# Patient Record
Sex: MALE | Race: WHITE | NOT HISPANIC OR LATINO | Employment: OTHER | URBAN - METROPOLITAN AREA
[De-identification: names, ages, dates, MRNs, and addresses within clinical notes are randomized per-mention and may not be internally consistent; named-entity substitution may affect disease eponyms.]

---

## 2017-08-07 PROBLEM — M54.6 THORACIC SPINE PAIN: Status: ACTIVE | Noted: 2017-08-07

## 2017-11-21 ENCOUNTER — PATIENT OUTREACH (OUTPATIENT)
Dept: HEALTH INFORMATION MANAGEMENT | Facility: OTHER | Age: 72
End: 2017-11-21

## 2017-11-21 NOTE — PROGRESS NOTES
Attempt #:1    WebIZ Checked & Epic Updated: Epic matches WebIZ  · WebIZ Recommendations: Patient is up to date on all vaccines  · Is patient due for Tdap? NO  · Is patient due for Shingles? NO  [facility] Verified: no  Verify PCP: yes    Communication Preference Obtained: yes     Review Care Team: yes    Annual Wellness Visit Scheduling  1. Scheduling Status:Scheduled     Care Gap Scheduling (Attempt to Schedule EACH Overdue Care Gap!)     There are no preventive care reminders to display for this patient.     Scheduled patient for Annual Wellness Visit       Bubbl Activation: declined  Bubbl Caroline: no  Virtual Visits: no  Opt In to Text Messages: no

## 2018-04-05 PROBLEM — M16.10 HIP ARTHRITIS: Status: ACTIVE | Noted: 2018-04-05

## 2019-05-28 PROBLEM — D12.5 ADENOMATOUS POLYP OF SIGMOID COLON: Status: ACTIVE | Noted: 2019-05-28

## 2021-12-20 PROBLEM — N40.0 BENIGN LOCALIZED HYPERPLASIA OF PROSTATE: Status: ACTIVE | Noted: 2021-12-20

## 2021-12-20 PROBLEM — J45.909 ASTHMA WITHOUT STATUS ASTHMATICUS: Status: ACTIVE | Noted: 2021-12-20
